# Patient Record
Sex: FEMALE | Race: WHITE | HISPANIC OR LATINO | URBAN - METROPOLITAN AREA
[De-identification: names, ages, dates, MRNs, and addresses within clinical notes are randomized per-mention and may not be internally consistent; named-entity substitution may affect disease eponyms.]

---

## 2017-09-08 ENCOUNTER — APPOINTMENT (RX ONLY)
Dept: URBAN - METROPOLITAN AREA CLINIC 46 | Facility: CLINIC | Age: 32
Setting detail: DERMATOLOGY
End: 2017-09-08

## 2017-09-08 DIAGNOSIS — L56.2 PHOTOCONTACT DERMATITIS [BERLOQUE DERMATITIS]: ICD-10-CM

## 2017-09-08 DIAGNOSIS — L70.0 ACNE VULGARIS: ICD-10-CM

## 2017-09-08 PROCEDURE — ? PRESCRIPTION

## 2017-09-08 PROCEDURE — ? COUNSELING

## 2017-09-08 PROCEDURE — 99213 OFFICE O/P EST LOW 20 MIN: CPT

## 2017-09-08 PROCEDURE — ? TREATMENT REGIMEN

## 2017-09-08 RX ORDER — MOMETASONE FUROATE 1 MG/G
CREAM TOPICAL
Qty: 1 | Refills: 0 | Status: ERX | COMMUNITY
Start: 2017-09-08

## 2017-09-08 RX ORDER — TRETINOIN 0.5 MG/G
CREAM TOPICAL
Qty: 3 | Refills: 1 | Status: ERX | COMMUNITY
Start: 2017-09-08

## 2017-09-08 RX ORDER — AMOXICILLIN 500 MG/1
CAPSULE ORAL
Qty: 60 | Refills: 0 | Status: ERX | COMMUNITY
Start: 2017-09-08

## 2017-09-08 RX ADMIN — MOMETASONE FUROATE: 1 CREAM TOPICAL at 14:35

## 2017-09-08 RX ADMIN — TRETINOIN: 0.5 CREAM TOPICAL at 14:38

## 2017-09-08 RX ADMIN — AMOXICILLIN: 500 CAPSULE ORAL at 14:38

## 2017-09-08 ASSESSMENT — LOCATION ZONE DERM
LOCATION ZONE: LEG
LOCATION ZONE: FACE

## 2017-09-08 ASSESSMENT — LOCATION DETAILED DESCRIPTION DERM
LOCATION DETAILED: RIGHT ANTERIOR PROXIMAL THIGH
LOCATION DETAILED: LEFT CENTRAL MALAR CHEEK

## 2017-09-08 ASSESSMENT — LOCATION SIMPLE DESCRIPTION DERM
LOCATION SIMPLE: RIGHT THIGH
LOCATION SIMPLE: LEFT CHEEK

## 2017-09-08 NOTE — PROCEDURE: TREATMENT REGIMEN
Detail Level: Zone
Plan: Skin ceuticals cleanser or panoxyl wash\\nFor future breakouts use amoxicillin